# Patient Record
Sex: MALE | Race: WHITE | NOT HISPANIC OR LATINO | URBAN - METROPOLITAN AREA
[De-identification: names, ages, dates, MRNs, and addresses within clinical notes are randomized per-mention and may not be internally consistent; named-entity substitution may affect disease eponyms.]

---

## 2023-07-11 ENCOUNTER — OFFICE (OUTPATIENT)
Dept: URBAN - METROPOLITAN AREA CLINIC 11 | Facility: CLINIC | Age: 38
End: 2023-07-11

## 2023-07-11 VITALS
HEART RATE: 90 BPM | HEIGHT: 67 IN | OXYGEN SATURATION: 97 % | DIASTOLIC BLOOD PRESSURE: 81 MMHG | WEIGHT: 148 LBS | SYSTOLIC BLOOD PRESSURE: 140 MMHG

## 2023-07-11 DIAGNOSIS — Z83.71 FAMILY HISTORY OF COLONIC POLYPS: ICD-10-CM

## 2023-07-11 DIAGNOSIS — Z12.11 ENCOUNTER FOR SCREENING FOR MALIGNANT NEOPLASM OF COLON: ICD-10-CM

## 2023-07-11 PROCEDURE — S0285 CNSLT BEFORE SCREEN COLONOSC: HCPCS

## 2023-07-11 RX ORDER — POLYETHYLENE GLYCOL 3350, SODIUM SULFATE, SODIUM CHLORIDE, POTASSIUM CHLORIDE, ASCORBIC ACID, SODIUM ASCORBATE 140-9-5.2G
KIT ORAL
Qty: 1 | Refills: 0 | Status: COMPLETED
Start: 2023-07-11 | End: 2023-08-01

## 2023-07-11 NOTE — SERVICEHPINOTES
Marc is an extremely pleasant 38-year-old white male who presents today after referral from his PCP.  He reports he was discussing with his PCP that his brother was recently diagnosed with colon polyps at age 40 and it was recommended that he see GI. he denies any other family history that he is aware of.  Patient denies any nausea, vomiting, dysphagia, odynophagia.  He endorses occasional heartburn reflux, approximately once a week, for which she will take Tums.   he denies any change in bowel habits reports having a bowel movement twice a day of normal color and consistency without any blood or mucus.  He denies any tobacco use, endorses occasional weekend glass of wine.  He is not on any blood thinners and denies any known cardiac, renal, hematologic or neurologic disorders.  He does have a history of histoplasmosis and gets f/u with CT.

## 2023-08-01 ENCOUNTER — AMBULATORY SURGICAL CENTER (OUTPATIENT)
Dept: URBAN - METROPOLITAN AREA SURGERY 3 | Facility: SURGERY | Age: 38
End: 2023-08-01

## 2023-08-01 ENCOUNTER — OFFICE (OUTPATIENT)
Dept: URBAN - METROPOLITAN AREA PATHOLOGY 12 | Facility: PATHOLOGY | Age: 38
End: 2023-08-01

## 2023-08-01 VITALS
HEART RATE: 69 BPM | RESPIRATION RATE: 18 BRPM | SYSTOLIC BLOOD PRESSURE: 109 MMHG | HEART RATE: 69 BPM | DIASTOLIC BLOOD PRESSURE: 58 MMHG | HEART RATE: 73 BPM | OXYGEN SATURATION: 99 % | WEIGHT: 144 LBS | SYSTOLIC BLOOD PRESSURE: 129 MMHG | HEIGHT: 67 IN | HEIGHT: 67 IN | OXYGEN SATURATION: 97 % | SYSTOLIC BLOOD PRESSURE: 129 MMHG | DIASTOLIC BLOOD PRESSURE: 68 MMHG | HEART RATE: 77 BPM | RESPIRATION RATE: 17 BRPM | DIASTOLIC BLOOD PRESSURE: 57 MMHG | OXYGEN SATURATION: 98 % | HEART RATE: 75 BPM | WEIGHT: 144 LBS | HEART RATE: 73 BPM | HEART RATE: 69 BPM | WEIGHT: 144 LBS | SYSTOLIC BLOOD PRESSURE: 110 MMHG | HEART RATE: 77 BPM | RESPIRATION RATE: 19 BRPM | RESPIRATION RATE: 17 BRPM | DIASTOLIC BLOOD PRESSURE: 69 MMHG | HEART RATE: 73 BPM | HEART RATE: 75 BPM | SYSTOLIC BLOOD PRESSURE: 108 MMHG | OXYGEN SATURATION: 99 % | DIASTOLIC BLOOD PRESSURE: 69 MMHG | SYSTOLIC BLOOD PRESSURE: 108 MMHG | HEIGHT: 67 IN | DIASTOLIC BLOOD PRESSURE: 68 MMHG | RESPIRATION RATE: 19 BRPM | HEART RATE: 75 BPM | SYSTOLIC BLOOD PRESSURE: 111 MMHG | SYSTOLIC BLOOD PRESSURE: 129 MMHG | DIASTOLIC BLOOD PRESSURE: 64 MMHG | SYSTOLIC BLOOD PRESSURE: 109 MMHG | TEMPERATURE: 98.2 F | DIASTOLIC BLOOD PRESSURE: 59 MMHG | DIASTOLIC BLOOD PRESSURE: 69 MMHG | DIASTOLIC BLOOD PRESSURE: 64 MMHG | OXYGEN SATURATION: 98 % | TEMPERATURE: 98.2 F | DIASTOLIC BLOOD PRESSURE: 57 MMHG | RESPIRATION RATE: 18 BRPM | DIASTOLIC BLOOD PRESSURE: 58 MMHG | SYSTOLIC BLOOD PRESSURE: 109 MMHG | SYSTOLIC BLOOD PRESSURE: 111 MMHG | DIASTOLIC BLOOD PRESSURE: 57 MMHG | OXYGEN SATURATION: 99 % | TEMPERATURE: 98 F | TEMPERATURE: 98 F | OXYGEN SATURATION: 96 % | SYSTOLIC BLOOD PRESSURE: 111 MMHG | DIASTOLIC BLOOD PRESSURE: 64 MMHG | OXYGEN SATURATION: 97 % | OXYGEN SATURATION: 96 % | DIASTOLIC BLOOD PRESSURE: 59 MMHG | RESPIRATION RATE: 17 BRPM | HEART RATE: 77 BPM | SYSTOLIC BLOOD PRESSURE: 117 MMHG | SYSTOLIC BLOOD PRESSURE: 117 MMHG | SYSTOLIC BLOOD PRESSURE: 110 MMHG | DIASTOLIC BLOOD PRESSURE: 58 MMHG | SYSTOLIC BLOOD PRESSURE: 110 MMHG | RESPIRATION RATE: 18 BRPM | SYSTOLIC BLOOD PRESSURE: 117 MMHG | RESPIRATION RATE: 19 BRPM | TEMPERATURE: 98 F | DIASTOLIC BLOOD PRESSURE: 68 MMHG | DIASTOLIC BLOOD PRESSURE: 59 MMHG | SYSTOLIC BLOOD PRESSURE: 108 MMHG | TEMPERATURE: 98.2 F | OXYGEN SATURATION: 96 % | OXYGEN SATURATION: 97 % | OXYGEN SATURATION: 98 %

## 2023-08-01 DIAGNOSIS — Z12.11 ENCOUNTER FOR SCREENING FOR MALIGNANT NEOPLASM OF COLON: ICD-10-CM

## 2023-08-01 DIAGNOSIS — Z83.71 FAMILY HISTORY OF COLONIC POLYPS: ICD-10-CM

## 2023-08-01 DIAGNOSIS — K63.89 OTHER SPECIFIED DISEASES OF INTESTINE: ICD-10-CM

## 2023-08-01 PROCEDURE — 45380 COLONOSCOPY AND BIOPSY: CPT | Mod: 33 | Performed by: INTERNAL MEDICINE

## 2023-08-01 PROCEDURE — 88305 TISSUE EXAM BY PATHOLOGIST: CPT | Performed by: PATHOLOGY
